# Patient Record
Sex: FEMALE | Race: WHITE | NOT HISPANIC OR LATINO | Employment: OTHER | URBAN - METROPOLITAN AREA
[De-identification: names, ages, dates, MRNs, and addresses within clinical notes are randomized per-mention and may not be internally consistent; named-entity substitution may affect disease eponyms.]

---

## 2017-10-26 DIAGNOSIS — N20.0 CALCULUS OF KIDNEY: ICD-10-CM

## 2017-11-27 ENCOUNTER — ALLSCRIPTS OFFICE VISIT (OUTPATIENT)
Dept: OTHER | Facility: OTHER | Age: 67
End: 2017-11-27

## 2017-11-27 LAB
BILIRUB UR QL STRIP: NEGATIVE
CLARITY UR: NORMAL
COLOR UR: YELLOW
GLUCOSE (HISTORICAL): NEGATIVE
HGB UR QL STRIP.AUTO: NEGATIVE
KETONES UR STRIP-MCNC: NEGATIVE MG/DL
LEUKOCYTE ESTERASE UR QL STRIP: NEGATIVE
NITRITE UR QL STRIP: NEGATIVE
PH UR STRIP.AUTO: 5.5 [PH]
PROT UR STRIP-MCNC: NEGATIVE MG/DL
SP GR UR STRIP.AUTO: 1.02
UROBILINOGEN UR QL STRIP.AUTO: 0.2

## 2018-01-15 VITALS
BODY MASS INDEX: 20.44 KG/M2 | SYSTOLIC BLOOD PRESSURE: 100 MMHG | DIASTOLIC BLOOD PRESSURE: 70 MMHG | WEIGHT: 138 LBS | HEIGHT: 69 IN

## 2018-11-02 RX ORDER — ANTIOX #8/OM3/DHA/EPA/LUT/ZEAX 250-2.5 MG
1 CAPSULE ORAL 2 TIMES DAILY
COMMUNITY

## 2018-11-02 RX ORDER — PROPRANOLOL HYDROCHLORIDE 10 MG/1
TABLET ORAL
Refills: 0 | COMMUNITY
Start: 2018-09-24 | End: 2021-06-29 | Stop reason: ALTCHOICE

## 2018-11-06 ENCOUNTER — OFFICE VISIT (OUTPATIENT)
Dept: UROLOGY | Facility: MEDICAL CENTER | Age: 68
End: 2018-11-06
Payer: MEDICARE

## 2018-11-06 VITALS
WEIGHT: 137 LBS | HEIGHT: 70 IN | BODY MASS INDEX: 19.61 KG/M2 | SYSTOLIC BLOOD PRESSURE: 102 MMHG | DIASTOLIC BLOOD PRESSURE: 60 MMHG

## 2018-11-06 DIAGNOSIS — N20.0 KIDNEY STONE: Primary | ICD-10-CM

## 2018-11-06 DIAGNOSIS — N30.20 CHRONIC CYSTITIS: ICD-10-CM

## 2018-11-06 LAB
SL AMB  POCT GLUCOSE, UA: ABNORMAL
SL AMB LEUKOCYTE ESTERASE,UA: ABNORMAL
SL AMB POCT BILIRUBIN,UA: ABNORMAL
SL AMB POCT BLOOD,UA: ABNORMAL
SL AMB POCT CLARITY,UA: CLEAR
SL AMB POCT COLOR,UA: YELLOW
SL AMB POCT KETONES,UA: ABNORMAL
SL AMB POCT NITRITE,UA: ABNORMAL
SL AMB POCT PH,UA: 5.5
SL AMB POCT SPECIFIC GRAVITY,UA: 1.02
SL AMB POCT URINE PROTEIN: ABNORMAL
SL AMB POCT UROBILINOGEN: 0.2

## 2018-11-06 PROCEDURE — 99213 OFFICE O/P EST LOW 20 MIN: CPT | Performed by: UROLOGY

## 2018-11-06 PROCEDURE — 81003 URINALYSIS AUTO W/O SCOPE: CPT | Performed by: UROLOGY

## 2018-11-06 RX ORDER — CIPROFLOXACIN 500 MG/1
500 TABLET, FILM COATED ORAL EVERY 12 HOURS SCHEDULED
Qty: 14 TABLET | Refills: 1 | Status: SHIPPED | OUTPATIENT
Start: 2018-11-06 | End: 2018-11-13

## 2018-11-06 NOTE — PROGRESS NOTES
[address] [facility]  [facility]  [address] [facility]  [place], [address] [place] [address]  [phone]  www  [facility]  org      NAME: Rose Crzu  AGE: 75 y o  SEX: female  : 1950   MRN: [id number]    DATE: 2018  TIME: 1:06 PM    Assessment and Plan:  Bilateral nephrolithiasis, unchanged and chronic cystitis  She is prone to sepsis  I refilled her Cipro and she can take this as needed  Follow-up 1 year with KUB  Chief Complaint   No chief complaint on file  History of Present Illness   Here for follow-up of nephrolithiasis with history of sepsis due to obstructing ureteral calculi in the past   She underwent cystoscopy and left ureteroscopy in , and ESWL in   We normally keep her with some Cipro on hand in case she develops signs of an infection because historically she has become very sick very quick  KUB 10/30/2018 at [facility] shows subcentimeter opaque densities overlying bilateral upper abdominal quadrants consistent with nephrolithiasis bilaterally"-I do not have the images but they showed no change  She has had to take the Cipro twice in the past year  Today's urinalysis shows no blood no signs of infection  The following portions of the patient's history were reviewed and updated as appropriate: allergies, current medications, past family history, past medical history, past social history, past surgical history and problem list     Review of Systems   Review of Systems    Active Problem List   There is no problem list on file for this patient  Objective   There were no vitals taken for this visit  Physical Exam   Constitutional: She is oriented to person, place, and time  She appears well-developed and well-nourished  HENT:   Head: Normocephalic and atraumatic  Eyes: EOM are normal    Neck: Normal range of motion  Pulmonary/Chest: Effort normal    Musculoskeletal: Normal range of motion  Neurological: She is alert and oriented to person, place, and time  Skin: Skin is warm and dry  Psychiatric: She has a normal mood and affect   Her behavior is normal  Judgment and thought content normal            Current Medications     Current Outpatient Prescriptions:     Multiple Vitamins-Minerals (PRESERVISION AREDS 2) CAPS, Take 1 capsule by mouth 2 (two) times a day, Disp: , Rfl:     propranolol (INDERAL) 10 mg tablet, TK 1 T PO BID PRF PALPITATIONS, Disp: , Rfl: 0        Viona Peabody, MD

## 2018-11-06 NOTE — LETTER
2018     Lovette Krabbe, [address] [address]    Patient: Ezequiel Moulton   YOB: 1950   Date of Visit: 2018       Dear Dr Tammy Ortega: Thank you for referring Shila Ruiz to me for evaluation  Below are my notes for this consultation  If you have questions, please do not hesitate to call me  I look forward to following your patient along with you  Sincerely,        Mor Lim MD        CC: No Recipients  Mor Lim MD  2018  3:12 PM  Sign at close encounter  [address] [facility]  [address], [address] [place] [phone]  www  [facility]  org      NAME: Shila Ruiz  AGE: 75 y o  SEX: female  : 1950   MRN: [id number]    DATE: 2018  TIME: 1:06 PM    Assessment and Plan:  Bilateral nephrolithiasis, unchanged and chronic cystitis  She is prone to sepsis  I refilled her Cipro and she can take this as needed  Follow-up 1 year with KUB  Chief Complaint   No chief complaint on file  History of Present Illness   Here for follow-up of nephrolithiasis with history of sepsis due to obstructing ureteral calculi in the past   She underwent cystoscopy and left ureteroscopy in , and ESWL in   We normally keep her with some Cipro on hand in case she develops signs of an infection because historically she has become very sick very quick  KUB 10/30/2018 at [facility] shows subcentimeter opaque densities overlying bilateral upper abdominal quadrants consistent with nephrolithiasis bilaterally"-I do not have the images but they showed no change  She has had to take the Cipro twice in the past year  Today's urinalysis shows no blood no signs of infection        The following portions of the patient's history were reviewed and updated as appropriate: allergies, current medications, past family history, past medical history, past social history, past surgical history and problem list     Review of Systems   Review of Systems    Active Problem List   There is no problem list on file for this patient  Objective   There were no vitals taken for this visit  Physical Exam   Constitutional: She is oriented to person, place, and time  She appears well-developed and well-nourished  HENT:   Head: Normocephalic and atraumatic  Eyes: EOM are normal    Neck: Normal range of motion  Pulmonary/Chest: Effort normal    Musculoskeletal: Normal range of motion  Neurological: She is alert and oriented to person, place, and time  Skin: Skin is warm and dry  Psychiatric: She has a normal mood and affect   Her behavior is normal  Judgment and thought content normal            Current Medications     Current Outpatient Prescriptions:     Multiple Vitamins-Minerals (PRESERVISION AREDS 2) CAPS, Take 1 capsule by mouth 2 (two) times a day, Disp: , Rfl:     propranolol (INDERAL) 10 mg tablet, TK 1 T PO BID PRF PALPITATIONS, Disp: , Rfl: 0        Archie Fang MD

## 2018-11-27 DIAGNOSIS — N20.0 CALCULUS OF KIDNEY: ICD-10-CM

## 2018-12-14 ENCOUNTER — TELEPHONE (OUTPATIENT)
Dept: UROLOGY | Facility: MEDICAL CENTER | Age: 68
End: 2018-12-14

## 2018-12-14 DIAGNOSIS — N30.20 CHRONIC CYSTITIS: Primary | ICD-10-CM

## 2018-12-14 RX ORDER — CIPROFLOXACIN 500 MG/1
500 TABLET, FILM COATED ORAL EVERY 12 HOURS SCHEDULED
Qty: 14 TABLET | Refills: 0 | Status: SHIPPED | OUTPATIENT
Start: 2018-12-14 | End: 2018-12-21

## 2018-12-14 NOTE — TELEPHONE ENCOUNTER
PT REQUESTING REFILL OF CIPRO TO BE SENT TO [facility] [address] [place] [address]  SHE IS GOING OUT OF TOWN THIS WEEKEND AND FEELS SHE MAY NEED ANOTHER ROUND OF ABX  HER C#[phone] IF ANY QUESTIONS

## 2018-12-14 NOTE — TELEPHONE ENCOUNTER
Left message for the patient to call back; Is she having any UTI symptoms or is this more of a precaution?

## 2018-12-14 NOTE — TELEPHONE ENCOUNTER
Spoke with the patient; She states that she feels her UTI symptoms returning and notes a slight burning with her urination  She's about to leave for [facility] and would like to take Cipro with her  I will run this by Dr Salty Etienne and see if he'll want a culture first or if he's okay with giving her a prescription  I'll call her back as soon as I have an answer

## 2019-11-06 ENCOUNTER — APPOINTMENT (OUTPATIENT)
Dept: RADIOLOGY | Facility: MEDICAL CENTER | Age: 69
End: 2019-11-06
Attending: UROLOGY
Payer: MEDICARE

## 2019-11-06 DIAGNOSIS — N20.0 KIDNEY STONE: ICD-10-CM

## 2019-11-06 PROCEDURE — 74018 RADEX ABDOMEN 1 VIEW: CPT

## 2019-11-12 ENCOUNTER — OFFICE VISIT (OUTPATIENT)
Dept: UROLOGY | Facility: MEDICAL CENTER | Age: 69
End: 2019-11-12
Payer: MEDICARE

## 2019-11-12 VITALS
WEIGHT: 133 LBS | DIASTOLIC BLOOD PRESSURE: 84 MMHG | HEART RATE: 84 BPM | HEIGHT: 70 IN | SYSTOLIC BLOOD PRESSURE: 122 MMHG | BODY MASS INDEX: 19.04 KG/M2

## 2019-11-12 DIAGNOSIS — N30.20 CHRONIC CYSTITIS: Primary | ICD-10-CM

## 2019-11-12 DIAGNOSIS — N20.0 KIDNEY STONE: ICD-10-CM

## 2019-11-12 LAB
SL AMB  POCT GLUCOSE, UA: NEGATIVE
SL AMB LEUKOCYTE ESTERASE,UA: NORMAL
SL AMB POCT BILIRUBIN,UA: NEGATIVE
SL AMB POCT BLOOD,UA: NEGATIVE
SL AMB POCT CLARITY,UA: CLEAR
SL AMB POCT COLOR,UA: YELLOW
SL AMB POCT KETONES,UA: NEGATIVE
SL AMB POCT NITRITE,UA: NEGATIVE
SL AMB POCT PH,UA: 6.5
SL AMB POCT SPECIFIC GRAVITY,UA: 1.02
SL AMB POCT URINE PROTEIN: NEGATIVE
SL AMB POCT UROBILINOGEN: 0.2

## 2019-11-12 PROCEDURE — 99212 OFFICE O/P EST SF 10 MIN: CPT | Performed by: UROLOGY

## 2019-11-12 PROCEDURE — 81003 URINALYSIS AUTO W/O SCOPE: CPT | Performed by: UROLOGY

## 2019-11-12 RX ORDER — CIPROFLOXACIN 500 MG/1
500 TABLET, FILM COATED ORAL EVERY 12 HOURS SCHEDULED
Qty: 14 TABLET | Refills: 0 | Status: SHIPPED | OUTPATIENT
Start: 2019-11-12 | End: 2019-11-19

## 2019-11-12 NOTE — PROGRESS NOTES
[address] [facility]  [facility]  [address] [facility]  [place], [address] [place] [address]  [phone]  www  [facility]  org      NAME: Rohan Bautista  AGE: 78 y o  SEX: female  : 1950   MRN: [id number]    DATE: 2019  TIME: 9:02 AM    Assessment and Plan:    Chronic cystitis with a tendency toward sepsis-Cipro refilled, she can take that when she develops symptoms  Bilateral nephrolithiasis:  Too small to treat, will check again with a KUB in 1 year  Chief Complaint   No chief complaint on file  History of Present Illness   Bilateral nephrolithiasis-KUB shows only a 3 mm stone on the left and a 2 mm on the right  We both reviewed the images  History of chronic cystitis and tendency to sepsis-I have historically kept her with a prescription of Cipro that she can take as needed  No infection for over a year  I refilled her Cipro  She is currently living in [place] where her  is in assisted living for Parkinson's disease  The following portions of the patient's history were reviewed and updated as appropriate: allergies, current medications, past family history, past medical history, past social history, past surgical history and problem list   Past Medical History:   Diagnosis Date    Cardiomyopathy ([address] )     Kidney stone     Severe sepsis ([address] )     Syncope and collapse     Ureteral stone      Past Surgical History:   Procedure Laterality Date    APPENDECTOMY      CYSTOSCOPY W/ RETROGRADES Left 2008    CYSTOSCOPY W/ URETEROSCOPY W/ LITHOTRIPSY Left     EXTRACORPOREAL SHOCK WAVE LITHOTRIPSY  ,      shoulder  Review of Systems   Review of Systems   Genitourinary: Negative  Active Problem List   There is no problem list on file for this patient  Objective   There were no vitals taken for this visit      Physical Exam        Current Medications     Current Outpatient Medications:     Multiple Vitamins-Minerals (PRESERVISION AREDS 2) CAPS, Take 1 capsule by mouth 2 (two) times a day, Disp: , Rfl:     propranolol (INDERAL) 10 mg tablet, TK 1 T PO BID PRF PALPITATIONS, Disp: , Rfl: 0        Dixie Harrison MD

## 2020-11-27 DIAGNOSIS — N20.0 KIDNEY STONE: Primary | ICD-10-CM

## 2021-03-10 DIAGNOSIS — Z23 ENCOUNTER FOR IMMUNIZATION: ICD-10-CM

## 2021-06-28 NOTE — PROGRESS NOTES
[address] [facility]  [facility]  [address] [facility]  [place], [address] [place] [address]  [phone]  www  [facility]  org      NAME: Sita Ledezma  AGE: 69 y o  SEX: female  : 1950   MRN: [id number]    DATE: 2021  TIME: 10:20 AM    Assessment and Plan:  History of recurrent cystitis with a tendency toward sepsis, nothing recent  Basically doing very well from my standpoint  History of nephrolithiasis, stone free as of her last renal ultrasound 2021  We will scan her recent images from [place] into our system and I will see back your as needed  Basically she is stable and does not need yearly urologic follow-up at this point  She will call me if she develops recurrent infections or has more stone episodes  Chief Complaint   No chief complaint on file  History of Present Illness     History of cystitis with tendency toward sepsis, and has been managed with a prescription of Cipro that she was at home to take p r n  Has had tiny bilateral nephrolithiasis  Last seen by me 2019  She passed this tiny stone 2020  No episodes since  Renal ultrasound 1021 in [place] shows no stones no hydronephrosis  Simple left renal cyst   We discussed the possibility of her taking Prolia  I told her that have seen an increase in stone formation in some women on it, but she is at high risk of fracture from osteoporosis as evident by her DEXA scan and in my opinion she should take it  For the record, her diagnosis of cardiomyopathy relates to when she was septic and probably had decreased cardiac function in 2016  She most likely does not currently have a cardiomyopathy        The following portions of the patient's history were reviewed and updated as appropriate: allergies, current medications, past family history, past medical history, past social history, past surgical history and problem list   Past Medical History:   Diagnosis Date    Cardiomyopathy [facility])     Kidney stone     Severe sepsis (HCC)     Syncope and collapse     Ureteral stone      Past Surgical History:   Procedure Laterality Date    APPENDECTOMY  1977    CYSTOSCOPY W/ RETROGRADES Left 2008    CYSTOSCOPY W/ URETEROSCOPY W/ LITHOTRIPSY Left 2008    EXTRACORPOREAL SHOCK WAVE LITHOTRIPSY  2008, 2009     shoulder  Review of Systems   Review of Systems   Genitourinary: Negative  Musculoskeletal: Negative for back pain  Active Problem List   There is no problem list on file for this patient  Objective   /80   Pulse 78   Ht 5' 10" (1 778 m)   Wt 65 3 kg (144 lb)   BMI 20 66 kg/m²     Physical Exam  Constitutional:       Appearance: Normal appearance  HENT:      Head: Normocephalic and atraumatic  Eyes:      Extraocular Movements: Extraocular movements intact  Pulmonary:      Effort: Pulmonary effort is normal    Musculoskeletal:         General: Normal range of motion  Cervical back: Normal range of motion  Skin:     Coloration: Skin is not jaundiced or pale  Neurological:      General: No focal deficit present  Mental Status: She is alert and oriented to person, place, and time  Mental status is at baseline  Psychiatric:         Mood and Affect: Mood normal          Behavior: Behavior normal          Thought Content:  Thought content normal          Judgment: Judgment normal              Current Medications     Current Outpatient Medications:     Multiple Vitamins-Minerals (PRESERVISION AREDS 2) CAPS, Take 1 capsule by mouth 2 (two) times a day, Disp: , Rfl:         Ele Ayoub MD

## 2021-06-29 ENCOUNTER — OFFICE VISIT (OUTPATIENT)
Dept: UROLOGY | Facility: MEDICAL CENTER | Age: 71
End: 2021-06-29
Payer: MEDICARE

## 2021-06-29 VITALS
BODY MASS INDEX: 20.62 KG/M2 | DIASTOLIC BLOOD PRESSURE: 80 MMHG | HEIGHT: 70 IN | WEIGHT: 144 LBS | SYSTOLIC BLOOD PRESSURE: 110 MMHG | HEART RATE: 78 BPM

## 2021-06-29 DIAGNOSIS — N30.20 CHRONIC CYSTITIS: ICD-10-CM

## 2021-06-29 DIAGNOSIS — N20.0 KIDNEY STONE: Primary | ICD-10-CM

## 2021-06-29 PROCEDURE — 99214 OFFICE O/P EST MOD 30 MIN: CPT | Performed by: UROLOGY
